# Patient Record
Sex: MALE | Race: WHITE | NOT HISPANIC OR LATINO | Employment: OTHER | URBAN - METROPOLITAN AREA
[De-identification: names, ages, dates, MRNs, and addresses within clinical notes are randomized per-mention and may not be internally consistent; named-entity substitution may affect disease eponyms.]

---

## 2017-08-15 ENCOUNTER — OFFICE VISIT (OUTPATIENT)
Dept: FAMILY MEDICINE CLINIC | Facility: CLINIC | Age: 27
End: 2017-08-15

## 2017-08-15 VITALS
DIASTOLIC BLOOD PRESSURE: 62 MMHG | TEMPERATURE: 98.2 F | WEIGHT: 154 LBS | HEART RATE: 66 BPM | HEIGHT: 73 IN | OXYGEN SATURATION: 98 % | SYSTOLIC BLOOD PRESSURE: 118 MMHG | BODY MASS INDEX: 20.41 KG/M2

## 2017-08-15 DIAGNOSIS — G43.109 MIGRAINE WITH AURA AND WITHOUT STATUS MIGRAINOSUS, NOT INTRACTABLE: Primary | ICD-10-CM

## 2017-08-15 PROCEDURE — 99213 OFFICE O/P EST LOW 20 MIN: CPT | Performed by: FAMILY MEDICINE

## 2017-08-15 RX ORDER — IBUPROFEN 600 MG/1
600 TABLET ORAL EVERY 6 HOURS PRN
COMMUNITY
End: 2018-01-08

## 2017-08-15 RX ORDER — SUMATRIPTAN 100 MG/1
TABLET, FILM COATED ORAL
Qty: 18 TABLET | Refills: 11 | Status: SHIPPED | OUTPATIENT
Start: 2017-08-15 | End: 2022-02-25

## 2017-08-15 NOTE — PROGRESS NOTES
Subjective   Von Gallegos is a 27 y.o. male    HPI Comments: Patient presents today with a 4 month history of recurring right frontal headache preceded by aura of bright light and blurred vision.  Headache accompanied by nausea, photophobia and phonophobia.  Headache relieved by rest, darkened room and air conditioning.  He usually also takes ibuprofen which seems to help some with the headache.  He denies any peripheral neurologic symptoms.  He has no history of head trauma or injury.  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  He reports that he is quite busy.    Migraine    Associated symptoms include photophobia. Pertinent negatives include no dizziness, fever, numbness, seizures or weakness.       The following portions of the patient's history were reviewed and updated as appropriate: allergies, current medications, past social history and problem list    Review of Systems   Constitutional: Negative for chills, fatigue and fever.   Eyes: Positive for photophobia and visual disturbance.   Respiratory: Negative.    Cardiovascular: Negative.    Gastrointestinal: Negative.    Musculoskeletal: Negative.    Skin: Negative for color change and rash.   Neurological: Positive for headaches. Negative for dizziness, tremors, seizures, weakness and numbness.   Hematological: Negative for adenopathy. Does not bruise/bleed easily.   Psychiatric/Behavioral: Negative for dysphoric mood. The patient is not nervous/anxious.        Objective     Vitals:    08/15/17 1555   BP: 118/62   Pulse: 66   Temp: 98.2 °F (36.8 °C)   SpO2: 98%       Physical Exam   Constitutional: He is oriented to person, place, and time. He appears well-developed and well-nourished.   HENT:   Head: Normocephalic and atraumatic.   Eyes: Conjunctivae and EOM are normal. Pupils are equal, round, and reactive to light. No scleral icterus.   Cardiovascular: Normal rate and regular rhythm.    Pulmonary/Chest:  Effort normal and breath sounds normal.   Neurological: He is alert and oriented to person, place, and time. He has normal reflexes. No cranial nerve deficit. He exhibits normal muscle tone. Coordination normal.   Skin: Skin is warm and dry.   Psychiatric: He has a normal mood and affect. His behavior is normal.   Nursing note and vitals reviewed.      Assessment/Plan   Problem List Items Addressed This Visit     None      Visit Diagnoses     Migraine with aura and without status migrainosus, not intractable    -  Primary    Relevant Medications    ibuprofen (ADVIL,MOTRIN) 600 MG tablet    SUMAtriptan (IMITREX) 100 MG tablet

## 2018-01-08 ENCOUNTER — OFFICE VISIT (OUTPATIENT)
Dept: FAMILY MEDICINE CLINIC | Facility: CLINIC | Age: 28
End: 2018-01-08

## 2018-01-08 VITALS
SYSTOLIC BLOOD PRESSURE: 108 MMHG | DIASTOLIC BLOOD PRESSURE: 60 MMHG | RESPIRATION RATE: 14 BRPM | HEIGHT: 73 IN | WEIGHT: 158.8 LBS | BODY MASS INDEX: 21.05 KG/M2 | HEART RATE: 73 BPM | OXYGEN SATURATION: 99 %

## 2018-01-08 DIAGNOSIS — F41.9 ANXIETY: Primary | ICD-10-CM

## 2018-01-08 PROCEDURE — 99213 OFFICE O/P EST LOW 20 MIN: CPT | Performed by: PHYSICIAN ASSISTANT

## 2018-01-08 NOTE — PROGRESS NOTES
Sandrita Gallegos is a 27 y.o. male    Anxiety   Presents for initial visit. The problem has been rapidly improving. Symptoms include decreased concentration, irritability, nervous/anxious behavior, panic and restlessness. Patient reports no chest pain, confusion or palpitations.       Patient is really bad situational anxiety when flying he needs something to take before he gets on airplane he states she gets extremely nervous palm sweating anxious.  This is a chronic issue this been going on for some time but is situational    The following portions of the patient's history were reviewed and updated as appropriate: allergies, current medications, past social history and problem list    Review of Systems   Constitutional: Positive for irritability. Negative for activity change, appetite change, fatigue and unexpected weight change.   Respiratory: Negative for chest tightness.    Cardiovascular: Negative for chest pain and palpitations.   Psychiatric/Behavioral: Positive for decreased concentration. Negative for agitation, behavioral problems, confusion, dysphoric mood and sleep disturbance. The patient is nervous/anxious. The patient is not hyperactive.        Objective     Vitals:    01/08/18 1507   BP: 108/60   Pulse: 73   Resp: 14   SpO2: 99%       Physical Exam   Constitutional: He is oriented to person, place, and time. He appears well-developed and well-nourished.   Neck: No JVD present.   Cardiovascular: Normal rate, regular rhythm, normal heart sounds, intact distal pulses and normal pulses.    No murmur heard.  Pulmonary/Chest: Effort normal and breath sounds normal. No respiratory distress.   Abdominal: Soft. Bowel sounds are normal. There is no hepatosplenomegaly. There is no tenderness.   Musculoskeletal: He exhibits no edema.   Neurological: He is alert and oriented to person, place, and time. No cranial nerve deficit.   Skin: Skin is warm and dry.   Psychiatric: He has a normal mood and affect.  His speech is normal and behavior is normal. Judgment and thought content normal. Cognition and memory are normal. He is attentive.   Nursing note and vitals reviewed.      Assessment/Plan     Diagnoses and all orders for this visit:    Anxiety    #1 Xanax 0.5 mg 1 by mouth every 6-8 hours dispense 16    Follow-up as needed

## 2019-03-08 ENCOUNTER — OFFICE VISIT (OUTPATIENT)
Dept: FAMILY MEDICINE CLINIC | Facility: CLINIC | Age: 29
End: 2019-03-08

## 2019-03-08 VITALS
HEART RATE: 63 BPM | TEMPERATURE: 98.7 F | OXYGEN SATURATION: 99 % | BODY MASS INDEX: 22.04 KG/M2 | WEIGHT: 167 LBS | SYSTOLIC BLOOD PRESSURE: 108 MMHG | DIASTOLIC BLOOD PRESSURE: 68 MMHG | RESPIRATION RATE: 18 BRPM

## 2019-03-08 DIAGNOSIS — G47.30 SLEEP APNEA, UNSPECIFIED TYPE: Primary | ICD-10-CM

## 2019-03-08 PROCEDURE — 99213 OFFICE O/P EST LOW 20 MIN: CPT | Performed by: FAMILY MEDICINE

## 2019-03-10 NOTE — PROGRESS NOTES
Subjective   Von Gallegos is a 28 y.o. male    Chief Complaint    Nocturnal shortness of breath    History of Present Illness  Patient presents today with a report that he awakens at night short of breath.  The startles him to the point that he sits up in bed suddenly at night.  His wife has noticed this also.  He does not have any excessive daytime sleepiness and there is no noted snoring.  This has been alarming to both the patient and his wife.    The following portions of the patient's history were reviewed and updated as appropriate: allergies, current medications, past social history and problem list    Review of Systems   Constitutional: Negative.    HENT: Negative.    Eyes: Negative.    Respiratory: Positive for shortness of breath.         SOB at night only   Cardiovascular: Negative.    Gastrointestinal: Negative.    Endocrine: Negative.    Genitourinary: Negative.    Skin: Negative.    Neurological: Negative.    Hematological: Negative.    Psychiatric/Behavioral: Negative.        Objective     Vitals:    03/08/19 1253   BP: 108/68   Pulse: 63   Resp: 18   Temp: 98.7 °F (37.1 °C)   SpO2: 99%       Physical Exam   Constitutional: He is oriented to person, place, and time. He appears well-developed and well-nourished.   HENT:   Head: Normocephalic and atraumatic.   Nose: Nose normal.   Mouth/Throat: Oropharynx is clear and moist.   Eyes: Conjunctivae are normal. Pupils are equal, round, and reactive to light.   Neck: Normal range of motion. Neck supple. No thyromegaly present.   Cardiovascular: Normal rate and regular rhythm.   Pulmonary/Chest: Effort normal and breath sounds normal.   Musculoskeletal: He exhibits no edema, tenderness or deformity.   Lymphadenopathy:     He has no cervical adenopathy.   Neurological: He is alert and oriented to person, place, and time.   Skin: Skin is warm and dry.   Psychiatric: He has a normal mood and affect. His behavior is normal.   Nursing note and vitals  reviewed.      Assessment/Plan   Problem List Items Addressed This Visit     None      Visit Diagnoses     Sleep apnea, unspecified type    -  Primary    Relevant Orders    Ambulatory Referral to Sleep Medicine

## 2019-04-05 ENCOUNTER — CONSULT (OUTPATIENT)
Dept: SLEEP MEDICINE | Facility: HOSPITAL | Age: 29
End: 2019-04-05

## 2019-04-05 VITALS
HEART RATE: 61 BPM | DIASTOLIC BLOOD PRESSURE: 73 MMHG | OXYGEN SATURATION: 99 % | BODY MASS INDEX: 21.66 KG/M2 | HEIGHT: 73 IN | SYSTOLIC BLOOD PRESSURE: 114 MMHG | WEIGHT: 163.4 LBS

## 2019-04-05 DIAGNOSIS — R06.89 GASPING FOR BREATH: ICD-10-CM

## 2019-04-05 DIAGNOSIS — R29.818 SUSPECTED SLEEP APNEA: Primary | ICD-10-CM

## 2019-04-05 DIAGNOSIS — R06.83 SNORING: ICD-10-CM

## 2019-04-05 DIAGNOSIS — G47.19 EXCESSIVE DAYTIME SLEEPINESS: ICD-10-CM

## 2019-04-05 PROCEDURE — 99214 OFFICE O/P EST MOD 30 MIN: CPT | Performed by: NURSE PRACTITIONER

## 2019-04-05 RX ORDER — IBUPROFEN 200 MG
200 TABLET ORAL EVERY 6 HOURS PRN
COMMUNITY
End: 2022-02-25

## 2019-04-05 RX ORDER — DOXYCYCLINE HYCLATE 100 MG/1
CAPSULE ORAL
COMMUNITY
Start: 2019-04-02 | End: 2019-05-24

## 2019-04-05 NOTE — PATIENT INSTRUCTIONS

## 2019-04-05 NOTE — PROGRESS NOTES
"Subjective:   Sleeping Problem      Chief Complaint:   Chief Complaint   Patient presents with   • Sleeping Problem       HPI:    Von Gallegos is a 28 y.o. male here for consult at the request of Dr. Hernández and Sandeep GOODEN.  One time in December 2018 patient awakened in the night gasping for breath.  This was scary for him as he was by himself.  This did occur a couple more times in January.  Since February and all of March patient is awakening 2-3 times at night with gasping.  He feels his throat is dry and will go and get a glass of water and immediately go back to sleep.  He initially feels sleepy upon awakening and will fall asleep on the couch when home from work if he has had a hard day.  He does not have difficulty driving due to sleepiness.  He does not take scheduled naps.  He is sleepy even when he increases his sleep time.    Patient does have a history of loud snoring usually on his back.  He does awaken with a dry mouth and his wife has witnessed apnea periods.  He feels he is awakened with coughing, choking, or respiratory discomfort.  He does have trouble breathing through his nose at night and during the day but denies any nasal surgeries or nasal fractures.  Patient has an Stock[place] score of 8/24.    Patient has intermittent reflux and this is remedied by an over-the-counter medication when needed.  Patient has no symptoms of narcolepsy, sleep paralysis, hypnagogic hallucinations, or cataplexy.    Patient has been told that he moves and jerks his legs during his sleep but he does not associate any arousals with this.  Patient only will occasionally act out dreams and awakens screaming during the night.  He does grind his teeth.    During the week patient will go to sleep 10:50 PM and awaken anywhere from 645-8.  It takes him usually 10 minutes to go to sleep and does feel \"slow \"upon awakening.  He approximates 7-8 hours nightly.  He does wake up approximately 3 times at night and per his report " this is all due to gasping.  On the weekend he will go to bed at 10 or 11 PM and awaken at 7- 8 AM.  Patient had an intentional weight gain of 10 pounds over the past year.  She denies any severe head traumas or closed head injuries/seizures.    Social history  28-year-old  male.  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  Patient did smoke cigarettes for 1 year but stopped in 2013.  Patient socially drinks 2-3 beers weekly.  Patient will drink 1-2 regular michel daily.  Patient denies illicit substance use.    Past Medical History:   Diagnosis Date   • GERD (gastroesophageal reflux disease)        Family History   Problem Relation Age of Onset   • Diabetes Mother    • Hyperlipidemia Mother    • Hypertension Mother    • Breast cancer Mother    • Sleep apnea Father    • Stroke Maternal Grandmother    • Breast cancer Maternal Grandmother    • Stroke Maternal Grandfather      Past Surgical History:   Procedure Laterality Date   • MOUTH SURGERY         Current medications are:   Current Outpatient Medications:   •  doxycycline (VIBRAMYCIN) 100 MG capsule, , Disp: , Rfl:   •  ibuprofen (ADVIL,MOTRIN) 200 MG tablet, Take 200 mg by mouth Every 6 (Six) Hours As Needed for Mild Pain ., Disp: , Rfl:   •  SUMAtriptan (IMITREX) 100 MG tablet, 1 tablet by mouth as needed for migraine.  May repeat dose in 4 hours if needed., Disp: 18 tablet, Rfl: 11.      The patient's relevant past medical, surgical, family and social history were reviewed and updated in Epic as appropriate.       Review of Systems   Constitutional: Positive for fatigue and unexpected weight change.   HENT: Positive for mouth sores, postnasal drip and sinus pain.    Respiratory: Positive for apnea.    Cardiovascular: Positive for leg swelling.   Musculoskeletal: Positive for arthralgias, back pain, joint swelling and myalgias.   Skin:        dry   Psychiatric/Behavioral: Positive for sleep disturbance.         Objective:    Physical Exam   Constitutional: He is  "oriented to person, place, and time. He appears well-developed and well-nourished.   HENT:   Head: Normocephalic and atraumatic.   Mouth/Throat: Oropharynx is clear and moist.   Mallampati 2 anatomy   Eyes: Conjunctivae are normal.   Neck: Neck supple. No thyromegaly present.   Cardiovascular: Regular rhythm. Bradycardia present.   Pulmonary/Chest: Effort normal and breath sounds normal.   Lymphadenopathy:     He has no cervical adenopathy.   Neurological: He is alert and oriented to person, place, and time.   Skin: Skin is warm and dry.   Psychiatric: He has a normal mood and affect. His behavior is normal. Judgment and thought content normal.   Nursing note and vitals reviewed.    /73   Pulse 61   Ht 185.4 cm (73\")   Wt 74.1 kg (163 lb 6.4 oz)   SpO2 99%   BMI 21.56 kg/m²       ASSESSMENT/PLAN    Von was seen today for sleeping problem.    Diagnoses and all orders for this visit:    Suspected sleep apnea  -     Home Sleep Study; Future    Excessive daytime sleepiness  -     Home Sleep Study; Future    Snoring  -     Home Sleep Study; Future    Gasping for breath  -     Home Sleep Study; Future            1. Counseled patient regarding multimodal approach with healthy nutrition, healthy sleep, regular physical activity, social activities, counseling, and medications. Encouraged to practice lateral sleep position. Avoid alcohol and sedatives close to bedtime.  2. Patient presents with history as noted above.  He does have disrupted sleep.  Could be related to his sleep disordered breathing I have discussed possible therapies for sleep disordered breathing including CPAP, weight control, oral appliances, and surgery.  We have also discussed the long-term consequences of untreated obstructive sleep apnea.  I will see patient back after his study for review and reassessment.    I have reviewed the results of my evaluation and impression and discussed my recommendations in detail with the " patient.      Signed by  Vanessa Braswell, APRN    April 5, 2019      CC: Kenny Tinajero PA Van Bussum, Robert Ritc*

## 2019-04-22 ENCOUNTER — HOSPITAL ENCOUNTER (OUTPATIENT)
Dept: SLEEP MEDICINE | Facility: HOSPITAL | Age: 29
Discharge: HOME | End: 2019-04-22
Admitting: NURSE PRACTITIONER

## 2019-04-22 VITALS
DIASTOLIC BLOOD PRESSURE: 66 MMHG | HEIGHT: 73 IN | SYSTOLIC BLOOD PRESSURE: 124 MMHG | WEIGHT: 162 LBS | HEART RATE: 74 BPM | OXYGEN SATURATION: 97 % | BODY MASS INDEX: 21.47 KG/M2

## 2019-04-22 DIAGNOSIS — R29.818 SUSPECTED SLEEP APNEA: ICD-10-CM

## 2019-04-22 DIAGNOSIS — G47.19 EXCESSIVE DAYTIME SLEEPINESS: ICD-10-CM

## 2019-04-22 DIAGNOSIS — R06.89 GASPING FOR BREATH: ICD-10-CM

## 2019-04-22 DIAGNOSIS — R06.83 SNORING: ICD-10-CM

## 2019-04-22 PROCEDURE — 95800 SLP STDY UNATTENDED: CPT

## 2019-04-22 PROCEDURE — 95800 SLP STDY UNATTENDED: CPT | Performed by: INTERNAL MEDICINE

## 2019-04-29 ENCOUNTER — OFFICE VISIT (OUTPATIENT)
Dept: SLEEP MEDICINE | Facility: HOSPITAL | Age: 29
End: 2019-04-29

## 2019-04-29 VITALS
SYSTOLIC BLOOD PRESSURE: 115 MMHG | HEART RATE: 70 BPM | BODY MASS INDEX: 21.26 KG/M2 | HEIGHT: 73 IN | WEIGHT: 160.4 LBS | DIASTOLIC BLOOD PRESSURE: 68 MMHG | OXYGEN SATURATION: 98 %

## 2019-04-29 DIAGNOSIS — R06.83 SNORING: ICD-10-CM

## 2019-04-29 DIAGNOSIS — R29.818 SUSPECTED SLEEP APNEA: Primary | ICD-10-CM

## 2019-04-29 PROCEDURE — 99213 OFFICE O/P EST LOW 20 MIN: CPT | Performed by: NURSE PRACTITIONER

## 2019-04-29 NOTE — PROGRESS NOTES
Subjective: Follow-up        Chief Complaint:   Chief Complaint   Patient presents with   • Follow-up       HPI:    Von Gallegos is a 28 y.o. male here for sleep study results.  Patient was seen here in consult for 5/19 or gasping in the night, daytime sleepiness, snoring, and witnessed apneas.  Patient did have a sleep study on 4/22/2019 that showed a normal AHI.  Patient is sleeping 7 to 8 hours nightly and does say he feels refreshed at this time upon awakening.  Patient has an Sp[place] score of 8/24.  Patient does take his episodes of gasping have significantly decreased.  We have discussed in depth the need for an lab study based on symptoms and patient declines.      Current medications are:   Current Outpatient Medications:   •  ibuprofen (ADVIL,MOTRIN) 200 MG tablet, Take 200 mg by mouth Every 6 (Six) Hours As Needed for Mild Pain ., Disp: , Rfl:   •  SUMAtriptan (IMITREX) 100 MG tablet, 1 tablet by mouth as needed for migraine.  May repeat dose in 4 hours if needed., Disp: 18 tablet, Rfl: 11  •  doxycycline (VIBRAMYCIN) 100 MG capsule, , Disp: , Rfl: .      The patient's relevant past medical, surgical, family and social history were reviewed and updated in Epic as appropriate.       Review of Systems   Constitutional: Positive for fatigue and unexpected weight change.   HENT: Positive for mouth sores.    Respiratory: Positive for apnea.    Cardiovascular: Positive for leg swelling.   Musculoskeletal: Positive for arthralgias, back pain, joint swelling and myalgias.   Psychiatric/Behavioral: Positive for sleep disturbance.   All other systems reviewed and are negative.        Objective:    Physical Exam   Constitutional: He is oriented to person, place, and time. He appears well-developed and well-nourished.   HENT:   Head: Normocephalic and atraumatic.   Mouth/Throat: Oropharynx is clear and moist.   Mallampati 2 anatomy   Eyes: Conjunctivae are normal.   Neck: Neck supple. No thyromegaly present.    Cardiovascular: Normal rate and regular rhythm.   Pulmonary/Chest: Effort normal and breath sounds normal.   Lymphadenopathy:     He has no cervical adenopathy.   Neurological: He is alert and oriented to person, place, and time.   Skin: Skin is warm and dry.   Psychiatric: He has a normal mood and affect. His behavior is normal. Judgment and thought content normal.   Nursing note and vitals reviewed.        ASSESSMENT/PLAN    Von was seen today for follow-up.    Diagnoses and all orders for this visit:    Suspected sleep apnea    Snoring            1. Counseled patient regarding multimodal approach with healthy nutrition, healthy sleep, regular physical activity, social activities, counseling, and medications. Encouraged to practice lateral sleep position. Avoid alcohol and sedatives close to bedtime.  2. Again we have discussed consequences of untreated sleep apnea patient declines an in lab sleep study.  He will call us with any needs or if he changes his mind.    I have reviewed the results of my evaluation and impression and discussed my recommendations in detail with the patient.      Signed by  Vanessa Braswell, DEMETRIA    April 29, 2019      CC: Leon Joseph MD          No ref. provider found

## 2019-05-24 ENCOUNTER — OFFICE VISIT (OUTPATIENT)
Dept: FAMILY MEDICINE CLINIC | Facility: CLINIC | Age: 29
End: 2019-05-24

## 2019-05-24 VITALS
WEIGHT: 163 LBS | HEART RATE: 77 BPM | SYSTOLIC BLOOD PRESSURE: 110 MMHG | OXYGEN SATURATION: 98 % | RESPIRATION RATE: 18 BRPM | DIASTOLIC BLOOD PRESSURE: 72 MMHG | BODY MASS INDEX: 21.51 KG/M2 | TEMPERATURE: 98.5 F

## 2019-05-24 DIAGNOSIS — R05.9 COUGH: Primary | ICD-10-CM

## 2019-05-24 PROCEDURE — 99213 OFFICE O/P EST LOW 20 MIN: CPT | Performed by: PHYSICIAN ASSISTANT

## 2019-05-24 RX ORDER — ALBUTEROL SULFATE 90 UG/1
2 AEROSOL, METERED RESPIRATORY (INHALATION) EVERY 4 HOURS PRN
Qty: 6.7 G | Refills: 11 | Status: SHIPPED | OUTPATIENT
Start: 2019-05-24 | End: 2020-10-05 | Stop reason: SDUPTHER

## 2019-05-24 NOTE — PROGRESS NOTES
Subjective   Von Gallegos is a 28 y.o. male  Follow-up (Pt had had coughing, wheezing and SOB last week and would like to discuss getting an inhaler.)      History of Present Illness  Patient a pleasant 28-year-old white male who comes in plan of cough congestion wheezing and chest needs refill albuterol inhaler which he uses accordingly no fever no chills no nausea vomiting meds working well no problems or complaints  The following portions of the patient's history were reviewed and updated as appropriate: allergies, current medications, past social history and problem list    Review of Systems   Constitutional: Negative for appetite change, diaphoresis, fatigue and unexpected weight change.   Eyes: Negative for visual disturbance.   Respiratory: Positive for cough, wheezing and stridor. Negative for chest tightness and shortness of breath.    Cardiovascular: Negative for chest pain, palpitations and leg swelling.   Gastrointestinal: Negative for diarrhea, nausea and vomiting.   Endocrine: Negative for polydipsia, polyphagia and polyuria.   Skin: Negative for color change and rash.   Neurological: Negative for dizziness, syncope, weakness, light-headedness, numbness and headaches.       Objective     Vitals:    05/24/19 1254   BP: 110/72   Pulse: 77   Resp: 18   Temp: 98.5 °F (36.9 °C)   SpO2: 98%       Physical Exam   Constitutional: He appears well-developed and well-nourished.   Neck: No JVD present.   Cardiovascular: Normal rate, regular rhythm, normal heart sounds, intact distal pulses and normal pulses.   No murmur heard.  Pulmonary/Chest: Effort normal and breath sounds normal. No respiratory distress.   Abdominal: Soft. Bowel sounds are normal. There is no hepatosplenomegaly. There is no tenderness.   Musculoskeletal: He exhibits no edema.   Skin: Skin is warm and dry.   Nursing note and vitals reviewed.      Assessment/Plan     Diagnoses and all orders for this visit:    Cough  -     albuterol sulfate HFA  108 (90 Base) MCG/ACT inhaler; Inhale 2 puffs Every 4 (Four) Hours As Needed for Wheezing.    Follow-up as needed

## 2020-10-05 DIAGNOSIS — R05.9 COUGH: ICD-10-CM

## 2020-10-05 RX ORDER — ALBUTEROL SULFATE 90 UG/1
2 AEROSOL, METERED RESPIRATORY (INHALATION) EVERY 4 HOURS PRN
Qty: 6.7 G | Refills: 11 | Status: SHIPPED | OUTPATIENT
Start: 2020-10-05 | End: 2021-01-13 | Stop reason: SDUPTHER

## 2020-10-05 NOTE — TELEPHONE ENCOUNTER
Caller: Von Gallegos    Relationship: Self    Best call back number: [phone]    Medication needed:   Requested Prescriptions     Pending Prescriptions Disp Refills   • albuterol sulfate  (90 Base) MCG/ACT inhaler 6.7 g 11     Sig: Inhale 2 puffs Every 4 (Four) Hours As Needed for Wheezing.       When do you need the refill by: 10/5/20    What details did the patient provide when requesting the medication: patient is out of medication    Does the patient have less than a 3 day supply:  [x] Yes  [] No    What is the patient's preferred pharmacy: [address] [phone] [place] [phone]

## 2021-01-13 DIAGNOSIS — R05.9 COUGH: ICD-10-CM

## 2021-01-13 NOTE — TELEPHONE ENCOUNTER
Caller: Von Gallegos    Relationship: Self    Best call back number: [phone]    Medication needed:   Requested Prescriptions     Pending Prescriptions Disp Refills   • albuterol sulfate  (90 Base) MCG/ACT inhaler 6.7 g 11     Sig: Inhale 2 puffs Every 4 (Four) Hours As Needed for Wheezing.       When do you need the refill by: ASAP    What details did the patient provide when requesting the medication: PATIENT IS REQUESTING 2 INHALERS SO HE CAN KEEP ONE IN HIS VEHICLE.     Does the patient have less than a 3 day supply:  [x] Yes  [] No    What is the patient's preferred pharmacy: [address] [phone] [place] [phone]

## 2021-01-14 RX ORDER — ALBUTEROL SULFATE 90 UG/1
2 AEROSOL, METERED RESPIRATORY (INHALATION) EVERY 4 HOURS PRN
Qty: 18 G | Refills: 6 | Status: SHIPPED | OUTPATIENT
Start: 2021-01-14 | End: 2022-02-25

## 2021-09-11 PROCEDURE — U0004 COV-19 TEST NON-CDC HGH THRU: HCPCS | Performed by: NURSE PRACTITIONER

## 2022-02-23 ENCOUNTER — TELEPHONE (OUTPATIENT)
Dept: FAMILY MEDICINE CLINIC | Facility: CLINIC | Age: 32
End: 2022-02-23

## 2022-02-23 NOTE — TELEPHONE ENCOUNTER
Caller: Von Gallegos    Relationship: Self    Best call back number: [phone]    What medication are you requesting: XANAX    What are your current symptoms: MILD FLIGHT ANXIETY    How long have you been experiencing symptoms: YEARS    Have you had these symptoms before:    [x] Yes  [] No    Have you been treated for these symptoms before:   [x] Yes  [] No    If a prescription is needed, what is your preferred pharmacy and phone number: [address] [phone] [facility] [phone]      Additional notes: PATIENT WILL BE FLYING OUT OF TOWN NEXT WEEK AND IF HE NEEDS TO MAKE AN APPOINTMENT TO COME IN PLEASE CALL AND LET HIM KNOW.

## 2022-02-25 ENCOUNTER — OFFICE VISIT (OUTPATIENT)
Dept: FAMILY MEDICINE CLINIC | Facility: CLINIC | Age: 32
End: 2022-02-25

## 2022-02-25 VITALS
HEART RATE: 71 BPM | BODY MASS INDEX: 21.87 KG/M2 | HEIGHT: 73 IN | SYSTOLIC BLOOD PRESSURE: 128 MMHG | DIASTOLIC BLOOD PRESSURE: 80 MMHG | WEIGHT: 165 LBS | TEMPERATURE: 96.6 F | RESPIRATION RATE: 15 BRPM | OXYGEN SATURATION: 98 %

## 2022-02-25 DIAGNOSIS — F41.8 SITUATIONAL ANXIETY: Primary | ICD-10-CM

## 2022-02-25 PROCEDURE — 99213 OFFICE O/P EST LOW 20 MIN: CPT | Performed by: FAMILY MEDICINE

## 2022-02-25 RX ORDER — ALPRAZOLAM 0.5 MG/1
TABLET ORAL
Qty: 20 TABLET | Refills: 0 | Status: SHIPPED | OUTPATIENT
Start: 2022-02-25 | End: 2023-02-28 | Stop reason: SDUPTHER

## 2022-02-25 NOTE — PROGRESS NOTES
Subjective   Von Gallegos is a 31 y.o. male    Chief Complaint  Anxiety    History of Present Illness  The patient has a history of flight anxiety and is preparing for an upcoming trip. He would like a prescription for alprazolam to keep him calm during the flight, which he has used in the past and has been helpful. The patient is working with his company, [employer], and still dealing with a significant amount of stress but is able to work through it. He reports being evaluated by Dr. Sandeep Tinajero in 2018 who provided his first prescription of Xanax. When provided medication the patient utilizes it as needed.     The following portions of the patient's history were reviewed and updated as appropriate: allergies, current medications, past social history and problem list    Review of Systems   Constitutional: Negative for appetite change and fatigue.   Respiratory: Negative for chest tightness and shortness of breath.    Gastrointestinal: Negative for abdominal pain, diarrhea and nausea.   Neurological: Negative for dizziness, tremors, weakness, light-headedness and headaches.   Psychiatric/Behavioral: Positive for dysphoric mood and sleep disturbance. Negative for agitation, behavioral problems, confusion, decreased concentration and suicidal ideas. The patient is nervous/anxious.        Objective     Vitals:    02/25/22 0802   BP: 128/80   Pulse: 71   Resp: 15   Temp: 96.6 °F (35.9 °C)   SpO2: 98%       Physical Exam  Vitals and nursing note reviewed.   Constitutional:       General: He is not in acute distress.     Appearance: Normal appearance. He is well-developed. He is not ill-appearing, toxic-appearing or diaphoretic.   HENT:      Head: Normocephalic and atraumatic.      Right Ear: External ear normal.      Left Ear: External ear normal.   Eyes:      Conjunctiva/sclera: Conjunctivae normal.      Pupils: Pupils are equal, round, and reactive to light.   Neck:      Thyroid: No thyromegaly.      Vascular: No  carotid bruit.   Cardiovascular:      Rate and Rhythm: Normal rate and regular rhythm.      Pulses: Normal pulses.      Heart sounds: Normal heart sounds. No murmur heard.      Pulmonary:      Effort: Pulmonary effort is normal. No respiratory distress.      Breath sounds: Normal breath sounds.   Abdominal:      General: Bowel sounds are normal.      Palpations: Abdomen is soft. There is no mass.      Tenderness: There is no abdominal tenderness.   Musculoskeletal:         General: No swelling. Normal range of motion.      Cervical back: Normal range of motion and neck supple.   Lymphadenopathy:      Cervical: No cervical adenopathy.   Skin:     General: Skin is warm and dry.      Findings: No lesion or rash.   Neurological:      Mental Status: He is alert and oriented to person, place, and time.      Cranial Nerves: No cranial nerve deficit.      Sensory: No sensory deficit.      Motor: No weakness.      Coordination: Coordination normal.      Gait: Gait normal.      Deep Tendon Reflexes: Reflexes are normal and symmetric.   Psychiatric:         Mood and Affect: Mood is anxious.         Behavior: Behavior normal.         Thought Content: Thought content normal.         Judgment: Judgment normal.         Assessment/Plan   Problems Addressed this Visit     None      Visit Diagnoses     Situational anxiety    -  Primary    Relevant Medications    ALPRAZolam (Xanax) 0.5 MG tablet      Diagnoses       Codes Comments    Situational anxiety    -  Primary ICD-10-CM: F41.8  ICD-9-CM: 300.09         I spent 20 minutes in patient care: Reviewing records prior to the visit, examining the patient, entering orders and documentation    Part of this note may be an electronic transcription/translation of spoken language to printed text using the Dragon Dictation System.            Transcribed from ambient dictation for SJ Joseph MD by Mayelin Ramirez.  02/25/22   10:04 EST    Patient verbalized consent to the visit  recording.  I have personally performed the services described in this document as transcribed by the above individual, and it is both accurate and complete.  SJ Joseph MD  2/25/2022  13:05 EST

## 2023-02-28 ENCOUNTER — TELEPHONE (OUTPATIENT)
Dept: FAMILY MEDICINE CLINIC | Facility: CLINIC | Age: 33
End: 2023-02-28
Payer: COMMERCIAL

## 2023-02-28 DIAGNOSIS — F41.8 SITUATIONAL ANXIETY: ICD-10-CM

## 2023-03-01 RX ORDER — ALPRAZOLAM 0.5 MG/1
TABLET ORAL
Qty: 20 TABLET | Refills: 0 | Status: SHIPPED | OUTPATIENT
Start: 2023-03-01

## 2023-03-01 NOTE — TELEPHONE ENCOUNTER
Provider: DR. KOENIG     Caller: KIMBER DE LA GARZA     Phone Number: [phone]    Reason for Call: PATIENT IS CALLING BACK TO CHECK THE STATUS OF THIS SINCE HE IS OUT